# Patient Record
Sex: MALE | Race: WHITE
[De-identification: names, ages, dates, MRNs, and addresses within clinical notes are randomized per-mention and may not be internally consistent; named-entity substitution may affect disease eponyms.]

---

## 2018-01-23 ENCOUNTER — HOSPITAL ENCOUNTER (EMERGENCY)
Dept: HOSPITAL 80 - CED | Age: 78
Discharge: ANOTHER HOSPITAL | End: 2018-01-23
Payer: COMMERCIAL

## 2018-01-23 VITALS — TEMPERATURE: 99.1 F

## 2018-01-23 VITALS
SYSTOLIC BLOOD PRESSURE: 133 MMHG | RESPIRATION RATE: 16 BRPM | OXYGEN SATURATION: 95 % | HEART RATE: 88 BPM | DIASTOLIC BLOOD PRESSURE: 62 MMHG

## 2018-01-23 DIAGNOSIS — R65.20: ICD-10-CM

## 2018-01-23 DIAGNOSIS — A41.9: Primary | ICD-10-CM

## 2018-01-23 DIAGNOSIS — J18.9: ICD-10-CM

## 2018-01-23 DIAGNOSIS — I10: ICD-10-CM

## 2018-01-23 DIAGNOSIS — E86.9: ICD-10-CM

## 2018-01-23 LAB — PLATELET # BLD: 173 10^3/UL (ref 150–400)

## 2018-01-23 PROCEDURE — 96361 HYDRATE IV INFUSION ADD-ON: CPT

## 2018-01-23 PROCEDURE — 96375 TX/PRO/DX INJ NEW DRUG ADDON: CPT

## 2018-01-23 PROCEDURE — 96374 THER/PROPH/DIAG INJ IV PUSH: CPT

## 2018-01-23 PROCEDURE — 99285 EMERGENCY DEPT VISIT HI MDM: CPT

## 2018-01-23 PROCEDURE — 71046 X-RAY EXAM CHEST 2 VIEWS: CPT

## 2018-01-23 PROCEDURE — 93005 ELECTROCARDIOGRAM TRACING: CPT

## 2018-01-23 NOTE — EDPHY
H & P


Stated Complaint: cough and congestion for 5 days


Time Seen by Provider: 01/23/18 12:54


HPI/ROS: 





This patient complains of a one-week history of coughing with worsening 

symptoms over the past 48 hr.  He explains that he has had coryza and a cough 

with increasing yellow sputum over that period of time and over the past 2 days 

developed subjective fevers as well and increasing fatigue.  He also describes 

lightheadedness today while in his feet and he had dry heaves this morning-

several episodes.  He did not eat breakfast due to ongoing nausea and has not 

had lunch either.  Due to worsening symptoms, he asked his wife to drive him in 

by private vehicle for evaluation.





ROS:  Constitutional:  Positive fatigue and subjective fevers.  No chills.


HEENT:  No facial pain. He has left ear tinnitus since the onset of symptoms. 

No ear pain however.  He complains of a mild sore throat. No sinus pain.


Pulmonary:  Denies shortness of breath. No pleuritic pain.  No hemoptysis.


Cardiovascular:  No chest pain. No heart palpitations.  No leg swelling. 

Positive lightheadedness.


GI:  Dry heaves.  No abdominal pain.  Slightly loose stool over the past few 

days.  No elisa diarrhea.  No bloody stools.


:  No flank pain, dysuria, testicle swelling or pain. No hematuria.


Integumentary:  No skin rash except minimal to his left foot the attributes to 

wearing tennis shoes without socks.


Endocrine:  No complaints


Psychiatric:  No complaints


Complete review of symptoms is otherwise negative.


Source: Patient


Exam Limitations: No limitations





- Medical/Surgical History


PMH: 





Hypertension





Gout





Benign prostatic hypertrophy





Soft tissue of neck complicated by sepsis-admitted to a [facility] in 2012


Hx Asthma: No


Hx Chronic Respiratory Disease: No


Hx Diabetes: No


Hx Cardiac Disease: No


Hx Renal Disease: No


Hx Cirrhosis: No


Hx Alcoholism: No


Hx HIV/AIDS: No


Hx Splenectomy or Spleen Trauma: No


Other PMH: GOUT, SEASONAL ALLERGIES, HYPERTENSION, urinary urgency





- Family History


Significant Family History: No pertinent family hx





- Social History


Smoking Status: Never smoked


Alcohol Use: Other (He drinks 2-3 glasses of wine a day.)


Drug Use: None


Additional Social History: 





He lives with his wife.  He attended [employer] and was on the 

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





- Physical Exam


Exam: 


Notable for low-grade fever, pulse of 102, normal respiratory rate, BP of 148/

79 and O2 sat on room air of 92%.


General Appearance:  Alert, no distress.


Eyes:  Pupils equal and round no pallor or injection.


ENT, Mouth:  Mucous membranes moist. Oropharynx:  No significant erythema.  No 

dysphonia.  He has no sinus tenderness to percussion.  Ears:  Clear external 

canals and TMs bilaterally.


Respiratory:  There are no retractions, lungs are clear to auscultation. He 

does have a intermittent cough appreciate no rales or rhonchi.


Cardiovascular:  Tachycardic with no murmur gallop or rub.


Gastrointestinal:  Abdomen is soft and nontender, no masses, bowel sounds 

normal.


Back:  No CVA tenderness


:  No testicular tenderness


Neurological:  GCS 15 with no focal deficits.


Skin:  Warm and dry, patient has scaly erythematous rash to feet left more than 

right consistent with tinea pedis.  Otherwise no other skin rashes. No pallor 

or diaphoresis.


Musculoskeletal:  Neck is supple nontender.


Extremities are symmetrical, full range of motion.


Psychiatric:  Mood and affect are normal





DIFFERENTIAL DIAGNOSIS:  After history and physical exam differential diagnosis 

was considered for influenza a, other viral syndrome, pneumonia, bronchitis, 

dehydration, UTI,


Constitutional: 


 Initial Vital Signs











Temperature (C)  37.7 C   01/23/18 12:54


 


Heart Rate  102 H  01/23/18 12:54


 


Respiratory Rate  18   01/23/18 12:54


 


Blood Pressure  148/79 H  01/23/18 12:54


 


O2 Sat (%)  92   01/23/18 12:54








 











O2 Delivery Mode               Nasal Cannula


 


O2 (L/minute)                  2














Allergies/Adverse Reactions: 


 





No Known Allergies Allergy (Verified 01/23/18 12:53)


 








Home Medications: 














 Medication  Instructions  Recorded


 


Exforge  mg  12/16/13


 


Singulair  12/16/13














Medical Decision Making





- Diagnostics


EKG Interpretation: 





12 lead EKG performed at 2:05 p.m. reveals sinus rhythm at 91





Intervals:  Normal throughout





Axis:  P of 63, QRS of -31, T 56





ST segments:  Normal throughout





Overall assessment: sinus rhythm with left axis deviation


Imaging Results: 


Two view chest x-ray:  Left lower lobe infiltrate by my interpretation 

consistent with pneumonia


Imaging: I viewed and interpreted images myself


ED Course/Re-evaluation: 





IV, normal saline bolus





Studies:  Flu swab is negative, CBC is notable for leukocytosis with white 

count 15,000 with a left shift.  Venous lactate is elevated 2.5.  Chemistries 

are normal with exception of a bicarb of 21 and a glucose of 106.  LFTs were 

checked due to social history of daily alcohol and LFTs are normal.





This patient qualifies as severe sepsis by presence of pulse greater than 90, 

leukocytosis with pneumonia source and presence of lactic acidosis.





He is treated with IV ceftriaxone 1 g IV and Zithromax 500 mg p.o.





He is responding well to initial fluid bolus with resolution of his tachycardia-

heart rate in the 90s.  Blood pressure is stable at 124/77 at 2:00 p.m..  We 

did start him on nasal cannula O2 for borderline hypoxia and a 2 .5 L nasal 

cannula he sats 94%.





The patient requests admission to [facility] due to proximity to he and 

his wife home and prior admission there.





I spoke via [employer]-with Dr. Fong - hospitalist at [facility] who accepts 

the patient for transfer





1st L saline bolus completed at 2:25 p.m.-2nd L saline started.  The patient 

remains hemodynamically stable. Patient still does not have the urge to urinate





Total continuous bedside critical care time:  15 min





EMS arrived to transfer patient to a [facility] at the time of transfer the patient 

remains hemodynamically stable and alert.





- Data Points


Laboratory Results: 


 Laboratory Results





 01/23/18 13:25 





 01/23/18 13:25 








Medications Given: 


 








Discontinued Medications





Acetaminophen (Tylenol)  1,000 mg PO EDNOW ONE


   Stop: 01/23/18 14:06


   Last Admin: 01/23/18 14:18 Dose:  1,000 mg


Azithromycin (Zithromax)  500 mg PO EDNOW ONE


   PRN Reason: Protocol


   Stop: 01/23/18 13:51


   Last Admin: 01/23/18 14:02 Dose:  500 mg


Sodium Chloride (Ns)  1,000 mls @ 0 mls/hr IV ONCE ONE; Wide Open


   PRN Reason: Protocol


   Stop: 01/23/18 13:19


   Last Admin: 01/23/18 13:26 Dose:  1,000 mls


Ceftriaxone Sodium 1 gm/ (Sterile Water)  10 mls @ 150 mls/hr IV EDNOW ONE


   PRN Reason: Protocol


   Stop: 01/23/18 13:53


   Last Admin: 01/23/18 14:01 Dose:  10 mls


Sodium Chloride (Ns)  1,000 mls @ 0 mls/hr IV EDNOW ONE; Wide Open


   PRN Reason: Protocol


   Stop: 01/23/18 14:28


   Last Admin: 01/23/18 14:31 Dose:  1,000 mls


Ondansetron HCl (Zofran)  4 mg IVP EDNOW ONE


   Stop: 01/23/18 13:20


   Last Admin: 01/23/18 13:27 Dose:  4 mg








Departure





- Departure


Disposition: [facility]


Clinical Impression: 


 Severe sepsis





Community acquired pneumonia


Qualifiers:


 Laterality: left Lung location: lower lobe of lung Qualified Code(s): J18.1 - 

Lobar pneumonia, unspecified organism





Condition: Fair


Referrals: 


Pollo Davies MD [Primary Care Provider] - As per Instructions

## 2018-01-23 NOTE — CPEKG
Heart Rate: 91

RR Interval: 659

P-R Interval: 156

QRSD Interval: 80

QT Interval: 360

QTC Interval: 443

P Axis: 63

QRS Axis: -31

T Wave Axis: 56

EKG Severity - OTHERWISE NORMAL ECG -

EKG Impression: SINUS RHYTHM

EKG Impression: LEFT AXIS DEVIATION

Electronically Signed By: Curt Sanchez 28-Jan-2018 09:37:12

## 2018-03-23 ENCOUNTER — HOSPITAL ENCOUNTER (OUTPATIENT)
Dept: HOSPITAL 80 - BMCIMAGING | Age: 78
End: 2018-03-23
Attending: INTERNAL MEDICINE
Payer: COMMERCIAL

## 2018-03-23 DIAGNOSIS — Z09: Primary | ICD-10-CM

## 2018-03-23 DIAGNOSIS — Z87.09: ICD-10-CM

## 2018-05-16 ENCOUNTER — HOSPITAL ENCOUNTER (OUTPATIENT)
Dept: HOSPITAL 80 - FIMAGING | Age: 78
End: 2018-05-16
Attending: INTERNAL MEDICINE
Payer: COMMERCIAL

## 2018-05-16 DIAGNOSIS — Z03.89: Primary | ICD-10-CM
